# Patient Record
Sex: FEMALE | Race: WHITE | NOT HISPANIC OR LATINO | URBAN - METROPOLITAN AREA
[De-identification: names, ages, dates, MRNs, and addresses within clinical notes are randomized per-mention and may not be internally consistent; named-entity substitution may affect disease eponyms.]

---

## 2023-07-31 RX ORDER — MONTELUKAST SODIUM 10 MG/1
TABLET ORAL
COMMUNITY
Start: 2023-04-28

## 2023-08-01 NOTE — PROGRESS NOTES
08/14/23 0001   Pre-Procedure Phone Call   Procedure Time Verified Yes   Arrival Time 1200   Procedure Location Verified Yes   Medical History Reviewed No   NPO Status Reinforced Yes   Ride and Caregiver Arranged Yes   Patient Knows to Bring Current Medications   (No changes in medications since last reviewed.)

## 2023-08-14 ENCOUNTER — HOSPITAL ENCOUNTER (OUTPATIENT)
Dept: GENERAL RADIOLOGY | Facility: HOSPITAL | Age: 28
Discharge: HOME | End: 2023-08-14
Admitting: OPTOMETRIST
Payer: COMMERCIAL

## 2023-08-14 VITALS
WEIGHT: 152 LBS | BODY MASS INDEX: 25.33 KG/M2 | SYSTOLIC BLOOD PRESSURE: 102 MMHG | HEART RATE: 61 BPM | RESPIRATION RATE: 16 BRPM | TEMPERATURE: 99.8 F | DIASTOLIC BLOOD PRESSURE: 65 MMHG | HEIGHT: 65 IN | OXYGEN SATURATION: 98 %

## 2023-08-14 DIAGNOSIS — H47.10 UNSPECIFIED PAPILLEDEMA: ICD-10-CM

## 2023-08-14 LAB
APPEARANCE CSF: CLEAR
COLOR CSF: COLORLESS
GLUCOSE CSF-MCNC: 61 MG/DL (ref 40–70)
HCG SERPL QL: NEGATIVE
METHOD: ABNORMAL
NUC CELL # CSF MANUAL: 1 /MM3 (ref 0–5)
PROT CSF-MCNC: 16.7 MG/DL (ref 15–45)
RBC # CSF MANUAL: 1 /MM3 (ref 0–0)
TUBE # CSF: 4

## 2023-08-14 PROCEDURE — 89050 BODY FLUID CELL COUNT: CPT | Performed by: OPTOMETRIST

## 2023-08-14 PROCEDURE — 84157 ASSAY OF PROTEIN OTHER: CPT | Performed by: OPTOMETRIST

## 2023-08-14 PROCEDURE — 0 LIDOCAINE 1 % SOLUTION: Performed by: OPTOMETRIST

## 2023-08-14 PROCEDURE — 84703 CHORIONIC GONADOTROPIN ASSAY: CPT | Performed by: RADIOLOGY

## 2023-08-14 PROCEDURE — 82945 GLUCOSE OTHER FLUID: CPT | Performed by: OPTOMETRIST

## 2023-08-14 RX ORDER — LIDOCAINE HYDROCHLORIDE 10 MG/ML
10 INJECTION, SOLUTION INFILTRATION; PERINEURAL ONCE
Status: COMPLETED | OUTPATIENT
Start: 2023-08-14 | End: 2023-08-14

## 2023-08-14 RX ADMIN — LIDOCAINE HYDROCHLORIDE 10 ML: 10 INJECTION, SOLUTION INFILTRATION; PERINEURAL at 14:19

## 2023-08-14 NOTE — H&P
Name: Neha Singer ADMIT: 2023   : 1995  PCP: Oswald Leal MD    MRN: [id number] LOS: 0 days   AGE/SEX: 28 y.o. female  ROOM: Room/bed info not found     Chief complaint   Patient is a 28 y.o. female presents with papilledema.     History of Present Illness: papilledema    Past Surgical History:  Past Surgical History:   Procedure Laterality Date    NOSE SURGERY      broken       Past Medical History:  History reviewed. No pertinent past medical history.    Home Medications:  (Not in a hospital admission)      Allergies:  Patient has no known allergies.    Family History:  No family history on file.    Social History:        Objective     Physical Exam:   Unremark.for intend.proc.    Vital Signs  Temp:  [99.8 øF (37.7 øC)] 99.8 øF (37.7 øC)  Heart Rate:  [87] 87  Resp:  [18] 18  BP: (111)/(70) 111/70    Anticipated Surgical Procedure:  LP    The risks, benefits and alternatives of this procedure have been discussed with the patient or responsible party: Yes      Remy Mcguire MD  23  13:22 EDT

## 2023-08-14 NOTE — DISCHARGE INSTRUCTIONS
EDUCATION /DISCHARGE INSTRUCTION   A lumbar puncture involves insertion of a sterile needle into the spinal canal by the physician   This procedure is performed for several reasons: to detect increased intracranial pressure, the presence of blood in cerebrospinal fluid (CFS), to obtain CSF specimens for laboratory studies, to administer drugs and to relieve pressure by removing CSF.    You will lie on your stomach with a pillow under your stomach.  This opens the vertebral space to allow access to the spinal needle.  The physician will sterilize the area using antiseptic solution and numb the area where the spinal needle will be placed.  If CSF is being removed for specimen, you may be asked to push or beardown to assist with the flow of the CSF. When the procedure is complete, a band aid will be placed over the injection site and you will be taken to the recovery area.    POTENTIAL RISKS OF A LUMBAR PUNCTURE INCLUDE BUT ARE NOT LIMITED TO:  *  Headache    *  Swelling at puncture site  *  Bleeding into the spinal canal *  Temporary difficulty urinating  *  Leakage of CSF   *  Fever  *  Pain caused by nerve irritation    BENEFIT OF PROCEDURE:  This is the only way to obtain spinal fluid or relieve pressure due to increased CSF.  There is no alternative.  THIS EDUCATION INFORMATION WAS REVIEWED PRIOR TO PROCEDURE AND CONSENT.   FOLLOWING A LUMBAR PUNCTURE:  *  Drink plenty of liquids -  Caffeine is recommended   *  Lie down flat for the next 8 hours.  If you get a headache, lie down flat for 24 hours, continue to force fluids and call your doctor if  the headache persists.  *  Go straight home.  DO NOT DRIVE    CALL YOUR DOCTOR IF YOU HAVE:  *  A severe headache that will not go away  *  Redness, swelling or drainage from the puncture site  *  Neck stiffness, numbness or weakness  *  Any new or severe symptoms    Following the procedure, you should continue to take all of your medications as directed by your primary  physician unless otherwise instructed.  There have been no changes to the medications you provided us (with the following exceptions if applicable):    YOU ARE THE MOST IMPORTANT FACTOR IN YOUR RECOVERY.  IF YOU HAVE QUESTIONS OR CONCERNS PLEASE CALL THE RADIOLOGY NURSES AT [phone]

## 2023-08-15 ENCOUNTER — TELEPHONE (OUTPATIENT)
Dept: INTERVENTIONAL RADIOLOGY/VASCULAR | Facility: HOSPITAL | Age: 28
End: 2023-08-15
Payer: COMMERCIAL

## 2023-08-18 ENCOUNTER — ANESTHESIA EVENT (OUTPATIENT)
Dept: PAIN MEDICINE | Facility: HOSPITAL | Age: 28
End: 2023-08-18
Payer: COMMERCIAL

## 2023-08-18 ENCOUNTER — HOSPITAL ENCOUNTER (OUTPATIENT)
Dept: GENERAL RADIOLOGY | Facility: HOSPITAL | Age: 28
Discharge: HOME | End: 2023-08-18
Payer: COMMERCIAL

## 2023-08-18 ENCOUNTER — HOSPITAL ENCOUNTER (OUTPATIENT)
Dept: PAIN MEDICINE | Facility: HOSPITAL | Age: 28
Discharge: HOME | End: 2023-08-18
Payer: COMMERCIAL

## 2023-08-18 ENCOUNTER — ANESTHESIA (OUTPATIENT)
Dept: PAIN MEDICINE | Facility: HOSPITAL | Age: 28
End: 2023-08-18
Payer: COMMERCIAL

## 2023-08-18 VITALS
BODY MASS INDEX: 24.99 KG/M2 | SYSTOLIC BLOOD PRESSURE: 115 MMHG | HEIGHT: 65 IN | DIASTOLIC BLOOD PRESSURE: 47 MMHG | OXYGEN SATURATION: 98 % | RESPIRATION RATE: 16 BRPM | WEIGHT: 150 LBS | TEMPERATURE: 98.2 F | HEART RATE: 68 BPM

## 2023-08-18 DIAGNOSIS — G97.1 POST-DURAL PUNCTURE HEADACHE: Primary | ICD-10-CM

## 2023-08-18 DIAGNOSIS — R52 PAIN: ICD-10-CM

## 2023-08-18 PROCEDURE — 77003 FLUOROGUIDE FOR SPINE INJECT: CPT

## 2023-08-18 RX ORDER — FENTANYL CITRATE 50 UG/ML
50 INJECTION, SOLUTION INTRAMUSCULAR; INTRAVENOUS ONCE
Status: DISCONTINUED | OUTPATIENT
Start: 2023-08-18 | End: 2023-08-19 | Stop reason: HOSPADM

## 2023-08-18 RX ORDER — ACETAZOLAMIDE 500 MG/1
500 CAPSULE, EXTENDED RELEASE ORAL 2 TIMES DAILY
COMMUNITY

## 2023-08-18 RX ORDER — SODIUM CHLORIDE 9 MG/ML
40 INJECTION, SOLUTION INTRAVENOUS AS NEEDED
Status: DISCONTINUED | OUTPATIENT
Start: 2023-08-18 | End: 2023-08-19 | Stop reason: HOSPADM

## 2023-08-18 RX ORDER — SODIUM CHLORIDE 0.9 % (FLUSH) 0.9 %
10 SYRINGE (ML) INJECTION AS NEEDED
Status: DISCONTINUED | OUTPATIENT
Start: 2023-08-18 | End: 2023-08-19 | Stop reason: HOSPADM

## 2023-08-18 RX ORDER — LIDOCAINE HYDROCHLORIDE 10 MG/ML
0.5 INJECTION, SOLUTION INFILTRATION; PERINEURAL ONCE AS NEEDED
Status: DISCONTINUED | OUTPATIENT
Start: 2023-08-18 | End: 2023-08-19 | Stop reason: HOSPADM

## 2023-08-18 RX ORDER — SODIUM CHLORIDE 0.9 % (FLUSH) 0.9 %
10 SYRINGE (ML) INJECTION EVERY 12 HOURS SCHEDULED
Status: DISCONTINUED | OUTPATIENT
Start: 2023-08-18 | End: 2023-08-19 | Stop reason: HOSPADM

## 2023-08-18 RX ORDER — LIDOCAINE HYDROCHLORIDE 10 MG/ML
1 INJECTION, SOLUTION INFILTRATION; PERINEURAL ONCE
Status: DISCONTINUED | OUTPATIENT
Start: 2023-08-18 | End: 2023-08-19 | Stop reason: HOSPADM

## 2023-08-18 RX ORDER — MIDAZOLAM HYDROCHLORIDE 1 MG/ML
1 INJECTION INTRAMUSCULAR; INTRAVENOUS ONCE AS NEEDED
Status: DISCONTINUED | OUTPATIENT
Start: 2023-08-18 | End: 2023-08-19 | Stop reason: HOSPADM

## 2023-08-18 RX ADMIN — SODIUM CHLORIDE, POTASSIUM CHLORIDE, SODIUM LACTATE AND CALCIUM CHLORIDE 500 ML: 600; 310; 30; 20 INJECTION, SOLUTION INTRAVENOUS at 10:50

## 2023-08-18 NOTE — ANESTHESIA PROCEDURE NOTES
Blood Patch      Patient reassessed immediately prior to procedure    Patient location during procedure: pain clinic  Blood patch placed: 8/18/2023 10:30 AM  Stop Time:8/18/2023 10:47 AM    Indications for Blood Patch: headache and dural puncture  Staff  Anesthesiologist: Franklin Calabrese MD  Preanesthetic Checklist  Completed: patient identified, IV checked, site marked, risks and benefits discussed, surgical consent, monitors and equipment checked, pre-op evaluation and timeout performed  Blood Patch Prep  Patient position: prone  Sterile Tech: gloves, mask, sterile barrier and cap.  Prep: ChloraPrep  Patient monitoring: blood pressure monitoring, continuous pulse ox and EKG  Location: L2-L3 (Interlaminar)  Blood Patch Procedure  Sedation:no    Approach: left paramedian   Guidance: fluoroscopy  Needle Gauge 20 G  Needle Type: Tuohy  Solution used: autologous blood  Loss of Resistance Medium: saline  Blood Patch Source:venous    Amount injected: 20 mL  Assessment  Patient tolerance:patient tolerated the procedure well with no apparent complications  Complications:no  Additional Notes  Post-Op Diagnosis Codes:     * Post-dural puncture headache [G97.1]     * Papilledema [377.0]

## 2023-08-18 NOTE — H&P
"[facility]    History and Physical    Patient Name: Neha Singer  :  1995  MRN:  [id number]  Date of Admission: 2023    Subjective     Patient is a 28 y.o. female presents with chief complaint of acute, intermittent, severe headache pain.  Onset of symptoms was gradual starting 2 days ago.  Symptoms are associated/aggravated by activity, sitting, standing, or walking for more than a few minutes. Symptoms improve with pain medication, lying down, and rest.  She has developed a positional headache after a diagnostic dural puncture for papilledema.  On a pain scale from 0-10, she rates her pain as a 3 while supine and a 9 while sitting or standing.  The pain is dull and frontal in nature and associated with some nausea but no vomiting.  She denies any visual disturbances.  She was referred to the pain clinic for lumbar epidural blood patch.    The following portions of the patients history were reviewed and updated as appropriate: current medications, allergies, past medical history, past surgical history, past family history, past social history, and problem list                Objective     Past Medical History: History reviewed. No pertinent past medical history.  Past Surgical History:   Past Surgical History:   Procedure Laterality Date    NOSE SURGERY      broken     Family History: History reviewed. No pertinent family history.  Social History:   Social History     Socioeconomic History    Marital status: Single       Vital Signs Range for the last 24 hours  Temperature: Temp:  [36.8 øC (98.2 øF)] 36.8 øC (98.2 øF)   Temp Source: Temp src: Infrared   BP: BP: (106)/(66) 106/66   Pulse: Heart Rate:  [57] 57   Respirations: Resp:  [16] 16   SPO2: SpO2:  [99 %] 99 %   O2 Amount (l/min):     O2 Devices Device (Oxygen Therapy): room air   Weight: Weight:  [68 kg (150 lb)] 68 kg (150 lb)     Flowsheet Rows      Flowsheet Row First Filed Value   Admission Height 165.1 cm (65\") Documented at " 08/18/2023 0956   Admission Weight 68 kg (150 lb) Documented at 08/18/2023 0956            --------------------------------------------------------------------------------    Current Outpatient Medications   Medication Sig Dispense Refill    acetaZOLAMIDE (DIAMOX) 500 MG capsule Take 1 capsule by mouth 2 (Two) Times a Day.      montelukast (SINGULAIR) 10 MG tablet TAKE 1 TABLET(10 MG) BY MOUTH 1 TIME EACH DAY       Current Facility-Administered Medications   Medication Dose Route Frequency Provider Last Rate Last Admin    lidocaine (XYLOCAINE) 1 % injection 0.5 mL  0.5 mL Intradermal Once PRN Franklin Calabrese MD        sodium chloride 0.9 % flush 10 mL  10 mL Intravenous Q12H Franklin Calabrese MD        sodium chloride 0.9 % flush 10 mL  10 mL Intravenous PRN Franklin Calabrese MD        sodium chloride 0.9 % infusion 40 mL  40 mL Intravenous Franklin Botello MD           --------------------------------------------------------------------------------  Assessment & Plan      Anesthesia Evaluation     Patient summary reviewed and Nursing notes reviewed   NPO Solid Status: > 8 hours  NPO Liquid Status: > 2 hours    Pain impairs ability to perform ADLs: Dressing, Toileting, Meal prep/Eating, Driving, Working and Exercise/Activity  Modalities previously tried to control pain with limited effectiveness within the last 4-6 weeks: Rest and OTC medications     Airway   Mallampati: II  TM distance: >3 FB  Neck ROM: full  Dental - normal exam     Pulmonary - negative pulmonary ROS and normal exam    breath sounds clear to auscultation  Cardiovascular - negative cardio ROS and normal exam    Rhythm: regular  Rate: normal    (-) angina, orthopnea, PND, JOLLY      Neuro/Psych- negative ROS  GI/Hepatic/Renal/Endo - negative ROS     Musculoskeletal (-) negative ROS    Abdominal    Substance History - negative use     OB/GYN negative ob/gyn ROS         Other - negative ROS                  Diagnosis and Plan    Treatment  Plan  ASA 3      Procedures: Epidural blood patch, With fluoroscopy,      Anesthetic plan and risks discussed with patient.      Alternative treatment consists of conservative management with hydration, bedrest and oral pain medication.      Diagnosis     * Post-dural puncture headache [G97.1]

## 2023-09-08 NOTE — PROGRESS NOTES
Patient ID: Neha Singer is a 28 y.o. female is being seen for consultation today at the request of Harmeet German OD for arachnoid cyst. Patient had MRI brain and MRI venogram done at [facility] on 07/03/2023.    Subjective     The patient is here in regards to   Chief Complaint   Patient presents with    Arachnoid cyst        History of Present Illness  Neha noticed a right-sided headache in association with blurry vision while reading and was worked up by her ophthalmologist for papilledema.  She was found to have papilledema and subsequent MRI was performed and she was found to have a left anterior temporal arachnoid cyst.  She had a lumbar puncture with an opening pressure around 34 which was then subsequently decreased to 22 and she found immediate relief in her headache and improvement in her vision.  She has been started on Diamox and her vision has remained improved since then    While in the room and during my examination of the patient I wore a mask and eye protection.  I washed my hands before and after this patient encounter.  The patient was also wearing a mask.    The following portions of the patient's history were reviewed and updated as appropriate: allergies, current medications, past family history, past medical history, past social history, past surgical history and problem list.    Review of Systems   Constitutional:  Negative for activity change, chills, fatigue and fever.   Respiratory:  Negative for cough and shortness of breath.    Cardiovascular:  Negative for chest pain.   Gastrointestinal:  Negative for nausea.   Musculoskeletal:  Negative for gait problem.   Neurological:  Positive for numbness (numbness in fingertips and toes). Negative for dizziness, weakness, light-headedness and headaches.      Past Medical History:   Diagnosis Date    Post-dural puncture headache 08/18/2023       No Known Allergies    Family History   Problem Relation Age of Onset    Diabetes Paternal  Grandfather     Cancer Paternal Grandmother         Breast    Hearing loss Paternal Grandmother        Social History     Socioeconomic History    Marital status: Single   Tobacco Use    Smoking status: Never   Vaping Use    Vaping Use: Never used   Substance and Sexual Activity    Alcohol use: Yes     Alcohol/week: 2.0 standard drinks     Types: 2 Drinks containing 0.5 oz of alcohol per week     Comment: Occasional    Drug use: Never    Sexual activity: Yes     Partners: Male     Birth control/protection: None       Past Surgical History:   Procedure Laterality Date    NOSE SURGERY  2007    broken         Objective     Vitals:    09/13/23 1431   BP: 104/66   Pulse: 84   Resp: 16   Temp: 98 °F (36.7 °C)   SpO2: 98%     Body mass index is 24.63 kg/m².    Physical Exam  Constitutional:       Appearance: Normal appearance.   HENT:      Head: Normocephalic and atraumatic.   Eyes:      Extraocular Movements: Extraocular movements intact.      Conjunctiva/sclera: Conjunctivae normal.      Pupils: Pupils are equal, round, and reactive to light.   Cardiovascular:      Rate and Rhythm: Normal rate and regular rhythm.      Pulses: Normal pulses.   Pulmonary:      Breath sounds: Normal breath sounds.   Abdominal:      Palpations: Abdomen is soft.   Musculoskeletal:         General: Normal range of motion.      Cervical back: Normal range of motion and neck supple.   Skin:     General: Skin is warm and dry.   Neurological:      Mental Status: She is alert and oriented to person, place, and time.      Cranial Nerves: Cranial nerves 2-12 are intact.      Motor: Motor function is intact. No weakness or atrophy.      Coordination: Coordination is intact. Romberg sign negative. Romberg Test normal.      Gait: Gait is intact. Gait normal.      Deep Tendon Reflexes: Reflexes are normal and symmetric.      Reflex Scores:       Tricep reflexes are 2+ on the right side and 2+ on the left side.       Bicep reflexes are 2+ on the right  side and 2+ on the left side.       Brachioradialis reflexes are 2+ on the right side and 2+ on the left side.       Patellar reflexes are 2+ on the right side and 2+ on the left side.       Achilles reflexes are 2+ on the right side and 2+ on the left side.  Psychiatric:         Speech: Speech normal.       Neurologic Exam     Mental Status   Oriented to person, place, and time.   Attention: normal. Concentration: normal.   Speech: speech is normal   Level of consciousness: alert    Cranial Nerves   Cranial nerves II through XII intact.     CN III, IV, VI   Pupils are equal, round, and reactive to light.    Motor Exam   Muscle bulk: normal  Overall muscle tone: normal    Strength   Strength 5/5 except as noted.     Sensory Exam   Light touch normal.     Gait, Coordination, and Reflexes     Gait  Gait: normal    Coordination   Romberg: negative    Reflexes   Reflexes 2+ except as noted.   Right brachioradialis: 2+  Left brachioradialis: 2+  Right biceps: 2+  Left biceps: 2+  Right triceps: 2+  Left triceps: 2+  Right patellar: 2+  Left patellar: 2+  Right achilles: 2+  Left achilles: 2+    Assessment & Plan   Independent Review of Radiographic Studies:      I personally reviewed the images from the following studies.    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  If she continues to have progression of visual symptoms then my recommendation would be to pursue an optic nerve sheath fenestration and if that does not work we could try venous stenting with my partner Dr. Betlre.  She is looking to get pregnant and I do not think it is a good idea to introduce a lumboperitoneal catheter or a ventriculoperitoneal catheter  under those circumstances.  She could continue to do well with Diamox alone.    I also counseled her on weight loss and dietary changes in regards to correlation with IIH and potential improvement in symptoms.    Medical Decision Making:      Follow-up in 1 year         Diagnoses and all orders for this visit:    1. IIH (idiopathic intracranial hypertension) (Primary)             Patient Instructions/Recommendations:    Follow-up in 1 year      Sonido Griffith MD  09/13/23  15:01 EDT

## 2023-09-13 ENCOUNTER — OFFICE VISIT (OUTPATIENT)
Dept: NEUROSURGERY | Facility: CLINIC | Age: 28
End: 2023-09-13
Payer: COMMERCIAL

## 2023-09-13 VITALS
DIASTOLIC BLOOD PRESSURE: 66 MMHG | BODY MASS INDEX: 24.66 KG/M2 | OXYGEN SATURATION: 98 % | HEIGHT: 65 IN | WEIGHT: 148 LBS | HEART RATE: 84 BPM | TEMPERATURE: 98 F | RESPIRATION RATE: 16 BRPM | SYSTOLIC BLOOD PRESSURE: 104 MMHG

## 2023-09-13 DIAGNOSIS — G93.2 IIH (IDIOPATHIC INTRACRANIAL HYPERTENSION): Primary | ICD-10-CM

## 2023-10-06 ENCOUNTER — TELEPHONE (OUTPATIENT)
Dept: NEUROLOGY | Facility: CLINIC | Age: 28
End: 2023-10-06
Payer: COMMERCIAL

## 2023-10-24 ENCOUNTER — TELEPHONE (OUTPATIENT)
Dept: NEUROLOGY | Facility: CLINIC | Age: 28
End: 2023-10-24
Payer: COMMERCIAL

## 2024-03-25 ENCOUNTER — OFFICE VISIT (OUTPATIENT)
Dept: NEUROLOGY | Facility: CLINIC | Age: 29
End: 2024-03-25
Payer: COMMERCIAL

## 2024-03-25 VITALS
BODY MASS INDEX: 23.32 KG/M2 | HEIGHT: 65 IN | OXYGEN SATURATION: 99 % | SYSTOLIC BLOOD PRESSURE: 124 MMHG | WEIGHT: 140 LBS | DIASTOLIC BLOOD PRESSURE: 70 MMHG | HEART RATE: 70 BPM

## 2024-03-25 DIAGNOSIS — G93.2 BENIGN INTRACRANIAL HYPERTENSION: Primary | ICD-10-CM

## 2024-03-25 PROCEDURE — 99204 OFFICE O/P NEW MOD 45 MIN: CPT | Performed by: PSYCHIATRY & NEUROLOGY

## 2024-03-25 NOTE — PROGRESS NOTES
"Chief Complaint   Patient presents with    Cerebral Cyst     Unspecified Papilledema       Patient ID: Neha Singer is a 28 y.o. female.    HPI: I had the pleasure of seeing your patient today.  As you may know she is a 28-year-old female being seen at the request of and referred to us by Dr. Leal for history of benign intracranial hypertension.  She apparently began having issues with headaches sometime in 2022.  She also noted some blurriness of the vision.  The headaches were a holocephalic \"pressure\" headache that seem to worsen with change of position.  She had no specific sensitivity to light or sound with her headache.  No nausea or vomiting.  She did have the blurry vision as I mentioned.  This went on and she eventually had an evaluation with her optometrist who noted optic nerve swelling.  She was then referred to an ophthalmologist who did confirm that.  She also had visual field testing.  She was referred for MRI imaging as well as MRV of the head.  The MRI did show an arachnoid cyst located at the anterior aspect of the left middle cranial fossa.  MRV did not show any specific venous sinus stenoses.  She was referred for lumbar puncture where opening pressure was noted to be 32.5.  Closing pressure was 22.  She seems to be doing much better since the lumbar puncture.  She did require a blood patch due to headache.  She was started on Diamox 500 mg twice daily.  She states that follow-up ophthalmologic evaluation revealed significant improvement.  She has been weaned down to 250 mg twice daily and continues at that dose.  She does have scheduled follow-up with ophthalmology soon.  She did also see a neurosurgeon.  No neurosurgical intervention was deemed necessary.  She did lose some weight.  She says that she has lost about 10 pounds.  She did have an IUD which has been removed.  She has not been taking any vitamin A derived medications.  No chronic steroids.  No severe traumatic head " injury.    The following portions of the patient's history were reviewed and updated as appropriate: allergies, current medications, past family history, past medical history, past social history, past surgical history and problem list.    Review of Systems   Neurological:  Positive for light-headedness and numbness (toes). Negative for dizziness, tremors, seizures, syncope, facial asymmetry, speech difficulty, weakness and headaches.   Psychiatric/Behavioral:  Negative for agitation, behavioral problems, confusion, decreased concentration, dysphoric mood, hallucinations, self-injury, sleep disturbance and suicidal ideas. The patient is not nervous/anxious and is not hyperactive.       I have reviewed the review of systems above performed by my medical assistant.      Vitals:    03/25/24 1144   BP: 124/70   Pulse: 70   SpO2: 99%       Neurologic Exam     Mental Status   Oriented to person, place, and time.   Registration: recalls 3 of 3 objects. Follows 3 step commands.   Attention: normal. Concentration: normal.   Speech: speech is normal   Level of consciousness: alert  Knowledge: consistent with education (No deficits found.).   Normal comprehension.     Cranial Nerves     CN II   Visual fields full to confrontation.     CN III, IV, VI   Pupils are equal, round, and reactive to light.  Extraocular motions are normal.   CN III: no CN III palsy  CN VI: no CN VI palsy  Nystagmus: none   Diplopia: none    CN V   Facial sensation intact.     CN VII   Facial expression full, symmetric.     CN VIII   CN VIII normal.     CN IX, X   CN IX normal.   CN X normal.     CN XI   CN XI normal.     CN XII   CN XII normal.     Motor Exam   Muscle bulk: normal  Right arm tone: normal  Left arm tone: normal  Right leg tone: normal  Left leg tone: normal    Strength   Right neck flexion: 5/5  Left neck flexion: 5/5  Right neck extension: 5/5  Left neck extension: 5/5  Right deltoid: 5/5  Left deltoid: 5/5  Right biceps: 5/5  Left  biceps: 5/5  Right triceps: 5/5  Left triceps: 5/5  Right wrist flexion: 5/5  Left wrist flexion: 5/5  Right wrist extension: 5/5  Left wrist extension: 5/5  Right interossei: 5/5  Left interossei: 5/5  Right abdominals: 5/5  Left abdominals: 5/5  Right iliopsoas: 5/5  Left iliopsoas: 5/5  Right quadriceps: 5/5  Left quadriceps: 5/5  Right hamstrin/5  Left hamstrin/5  Right glutei: 5/5  Left glutei: 5/5  Right anterior tibial: 5/5  Left anterior tibial: 5/5  Right posterior tibial: 5/5  Left posterior tibial: 5/5  Right peroneal: 5/5  Left peroneal: 5/5  Right gastroc: 5/5  Left gastroc: 5/5    Sensory Exam   Light touch normal.   Vibration normal.   Proprioception normal.   Pinprick normal.     Gait, Coordination, and Reflexes     Gait  Gait: normal    Coordination   Romberg: negative    Tremor   Resting tremor: absent  Intention tremor: absent    Reflexes   Right brachioradialis: 2+  Left brachioradialis: 2+  Right biceps: 2+  Left biceps: 2+  Right triceps: 2+  Left triceps: 2+  Right patellar: 2+  Left patellar: 2+  Right achilles: 2+  Left achilles: 2+  Right : 2+  Left : 2+Station is normal.       Physical Exam  Vitals reviewed.   Constitutional:       General: She is not in acute distress.     Appearance: She is well-developed.   HENT:      Head: Normocephalic and atraumatic.   Eyes:      Extraocular Movements: EOM normal.      Pupils: Pupils are equal, round, and reactive to light.   Cardiovascular:      Rate and Rhythm: Normal rate and regular rhythm.      Heart sounds: Normal heart sounds.   Pulmonary:      Effort: Pulmonary effort is normal. No respiratory distress.      Breath sounds: Normal breath sounds.   Abdominal:      General: Bowel sounds are normal. There is no distension.      Palpations: Abdomen is soft.      Tenderness: There is no abdominal tenderness.   Musculoskeletal:         General: No deformity.      Cervical back: Normal range of motion.   Skin:     General: Skin is  warm.      Findings: No rash.   Neurological:      Mental Status: She is oriented to person, place, and time.      Coordination: Romberg Test normal.      Gait: Gait is intact.      Deep Tendon Reflexes:      Reflex Scores:       Tricep reflexes are 2+ on the right side and 2+ on the left side.       Bicep reflexes are 2+ on the right side and 2+ on the left side.       Brachioradialis reflexes are 2+ on the right side and 2+ on the left side.       Patellar reflexes are 2+ on the right side and 2+ on the left side.       Achilles reflexes are 2+ on the right side and 2+ on the left side.  Psychiatric:         Speech: Speech normal.         Judgment: Judgment normal.         Procedures    Assessment/Plan: The plan will be to continue the Diamox 250 mg daily.  I did mention that she can see her ophthalmologist every 3 months.  After so many follow-up visits we would likely consider weaning her off of the Diamox.  She can use as needed naproxen for as needed headache onset otherwise.  We will see her back in 4 months or sooner if needed. A total of 45 minutes was spent face-to-face with the patient today.  Of that greater than 50% of this time was spent discussing signs and symptoms of idiopathic intracranial hypertension, patient education, plan of care and prognosis.           Diagnoses and all orders for this visit:    1. Benign intracranial hypertension (Primary)           Franklin Alvares II, MD

## 2024-06-07 DIAGNOSIS — G93.2 BENIGN INTRACRANIAL HYPERTENSION: Primary | ICD-10-CM

## 2024-06-07 RX ORDER — ACETAZOLAMIDE 500 MG/1
500 CAPSULE, EXTENDED RELEASE ORAL 2 TIMES DAILY
Qty: 60 CAPSULE | Refills: 1 | Status: SHIPPED | OUTPATIENT
Start: 2024-06-07

## 2024-08-06 ENCOUNTER — OFFICE VISIT (OUTPATIENT)
Dept: NEUROLOGY | Facility: CLINIC | Age: 29
End: 2024-08-06
Payer: COMMERCIAL

## 2024-08-06 VITALS
SYSTOLIC BLOOD PRESSURE: 110 MMHG | DIASTOLIC BLOOD PRESSURE: 70 MMHG | HEIGHT: 65 IN | HEART RATE: 67 BPM | BODY MASS INDEX: 22.99 KG/M2 | OXYGEN SATURATION: 99 % | WEIGHT: 138 LBS

## 2024-08-06 DIAGNOSIS — G93.2 BENIGN INTRACRANIAL HYPERTENSION: Primary | ICD-10-CM

## 2024-08-06 PROCEDURE — 99214 OFFICE O/P EST MOD 30 MIN: CPT | Performed by: PSYCHIATRY & NEUROLOGY

## 2024-08-06 RX ORDER — MELOXICAM 15 MG/1
TABLET ORAL
COMMUNITY
Start: 2024-08-01

## 2024-08-06 NOTE — PROGRESS NOTES
Chief Complaint   Patient presents with    Benign intracranial hypertension       Patient ID: Neha Singer is a 29 y.o. female.    HPI: I have had the pleasure of seeing your patient again today.  As you may know she is a 29-year-old female here for the management of benign intracranial hypertension.  She is still currently taking Diamox.  She is at a dose of 250 mg twice a day.  She states that she did have follow-up with her ophthalmologist not too long ago.  We do not yet have the follow-up report to review however she mentions that the evaluation went favorably and that there may not be much swelling any longer.  She did mention experiencing a lapse in her prescription renewal of the Diamox.  [place]  She notes that during that time she did experience a recurrence of headache.  She does mention it was quite severe.  Once back on the medicine her headaches resolved.  She  is not currently experiencing any significant headaches in fact she says that she is doing well today.  No new symptoms neurologically.    The following portions of the patient's history were reviewed and updated as appropriate: allergies, current medications, past family history, past medical history, past social history, past surgical history and problem list.    Review of Systems   Neurological:  Negative for dizziness, tremors, seizures, syncope, facial asymmetry, speech difficulty, weakness, light-headedness, numbness and headaches.   Psychiatric/Behavioral:  Negative for agitation, behavioral problems, confusion, decreased concentration, dysphoric mood, hallucinations, self-injury, sleep disturbance and suicidal ideas. The patient is not nervous/anxious and is not hyperactive.       I have reviewed the review of systems above performed by my medical assistant.      Vitals:    08/06/24 0834   BP: 110/70   Pulse: 67   SpO2: 99%       Neurologic Exam     Mental Status   Oriented to person, place, and time.    Concentration: normal.   Level of consciousness: alert  Knowledge: consistent with education (No deficits found.).     Cranial Nerves     CN II   Visual fields full to confrontation.     CN III, IV, VI   Pupils are equal, round, and reactive to light.  Extraocular motions are normal.   CN III: no CN III palsy  CN VI: no CN VI palsy    CN V   Facial sensation intact.     CN VII   Facial expression full, symmetric.     CN VIII   CN VIII normal.     CN IX, X   CN IX normal.   CN X normal.     CN XI   CN XI normal.     CN XII   CN XII normal.     Motor Exam     Strength   Right neck flexion: 5/5  Left neck flexion: 5/5  Right neck extension: 5/5  Left neck extension: 5/5  Right deltoid: 5/5  Left deltoid: 5/5  Right biceps: 5/5  Left biceps: 5/5  Right triceps: 5/5  Left triceps: 5/5  Right wrist flexion: 5/5  Left wrist flexion: 5/5  Right wrist extension: 5/5  Left wrist extension: 5/5  Right interossei: 5/5  Left interossei: 5/5  Right abdominals: 5/5  Left abdominals: 5/5  Right iliopsoas: 5/5  Left iliopsoas: 5/5  Right quadriceps: 5/5  Left quadriceps: 5/5  Right hamstrin/5  Left hamstrin/5  Right glutei: 5/5  Left glutei: 5/5  Right anterior tibial: 5/5  Left anterior tibial: 5/5  Right posterior tibial: 5/5  Left posterior tibial: 5/5  Right peroneal: 5/5  Left peroneal: 5/5  Right gastroc: 5/5  Left gastroc: 5/5    Sensory Exam   Light touch normal.   Vibration normal.     Gait, Coordination, and Reflexes     Gait  Gait: normal    Reflexes   Right brachioradialis: 2+  Left brachioradialis: 2+  Right biceps: 2+  Left biceps: 2+  Right triceps: 2+  Left triceps: 2+  Right patellar: 2+  Left patellar: 2+  Right achilles: 2+  Left achilles: 2+  Right : 2+  Left : 2+Station is normal.       Physical Exam  Vitals reviewed.   Constitutional:       Appearance: She is well-developed.   HENT:      Head: Normocephalic and atraumatic.   Eyes:      Extraocular Movements: EOM normal.      Pupils: Pupils are  equal, round, and reactive to light.   Cardiovascular:      Rate and Rhythm: Normal rate and regular rhythm.   Pulmonary:      Breath sounds: Normal breath sounds.   Musculoskeletal:         General: Normal range of motion.   Skin:     General: Skin is warm.   Neurological:      Mental Status: She is oriented to person, place, and time.      Gait: Gait is intact.      Deep Tendon Reflexes:      Reflex Scores:       Tricep reflexes are 2+ on the right side and 2+ on the left side.       Bicep reflexes are 2+ on the right side and 2+ on the left side.       Brachioradialis reflexes are 2+ on the right side and 2+ on the left side.       Patellar reflexes are 2+ on the right side and 2+ on the left side.       Achilles reflexes are 2+ on the right side and 2+ on the left side.        Procedures    Assessment/Plan: I would like to see the most recent ophthalmologic cool evaluation to review the funduscopic exam.  Our plan will be to continue with the Diamox for now.  No need for further neurodiagnostic testing.  We will see her back again in December however I advised her to continue follow-up with ophthalmology in October if possible.  We will see her back soon however she is to call after her ophthalmology appointment in October.  A total of 30 minutes was spent face-to-face with the patient today.  Of that greater than 50% of this time was spent discussing signs and symptoms of pseudotumor cerebri, patient education, plan of care and prognosis.         Diagnoses and all orders for this visit:    1. Benign intracranial hypertension (Primary)           Franklin Alvares II, MD

## 2024-12-11 ENCOUNTER — OFFICE VISIT (OUTPATIENT)
Dept: NEUROLOGY | Facility: CLINIC | Age: 29
End: 2024-12-11
Payer: COMMERCIAL

## 2024-12-11 VITALS
HEART RATE: 65 BPM | WEIGHT: 152 LBS | BODY MASS INDEX: 25.33 KG/M2 | SYSTOLIC BLOOD PRESSURE: 124 MMHG | OXYGEN SATURATION: 98 % | DIASTOLIC BLOOD PRESSURE: 88 MMHG | HEIGHT: 65 IN

## 2024-12-11 DIAGNOSIS — G93.2 BENIGN INTRACRANIAL HYPERTENSION: Primary | ICD-10-CM

## 2024-12-11 PROCEDURE — 99214 OFFICE O/P EST MOD 30 MIN: CPT | Performed by: PSYCHIATRY & NEUROLOGY

## 2024-12-11 RX ORDER — POTASSIUM CHLORIDE 1500 MG/1
20 TABLET, EXTENDED RELEASE ORAL DAILY
Qty: 30 TABLET | Refills: 11 | Status: SHIPPED | OUTPATIENT
Start: 2024-12-11 | End: 2025-12-11

## 2024-12-11 RX ORDER — ACETAZOLAMIDE 250 MG/1
250 TABLET ORAL 3 TIMES DAILY
Qty: 90 TABLET | Refills: 4 | Status: SHIPPED | OUTPATIENT
Start: 2024-12-11 | End: 2025-01-10

## 2024-12-11 NOTE — PROGRESS NOTES
"Chief Complaint   Patient presents with    Benign intracranial hypertension       Patient ID: Neha Singer is a 29 y.o. female.    HPI: I have had the pleasure of seeing your patient today.  As you may know she is a 29-year-old female here for the management of benign intracranial hypertension.  She did recently see her ophthalmologist who mentioned that the swelling in the right eye is slightly increased.  The left eye seemed stable.  She does have headaches 2 days/week.  They are not severe.  No significant sensitivity to light or sound.  No nausea or vomiting.  She is on Diamox 250 mg twice daily.  She is not experiencing any significant side effects to the Diamox based on questioning.  She may experience some \"floaters\" however no blackouts of vision.  No spots in her visual fields.    The following portions of the patient's history were reviewed and updated as appropriate: allergies, current medications, past family history, past medical history, past social history, past surgical history and problem list.    Review of Systems   Eyes:  Positive for photophobia (during HA).   Neurological:  Positive for headaches. Negative for dizziness, tremors, seizures, syncope, facial asymmetry, speech difficulty, weakness, light-headedness and numbness.   Psychiatric/Behavioral:  Negative for agitation, behavioral problems, confusion, decreased concentration, dysphoric mood, hallucinations, self-injury, sleep disturbance and suicidal ideas. The patient is not nervous/anxious and is not hyperactive.       I have reviewed the review of systems above performed by my medical assistant.      Vitals:    12/11/24 1445   BP: 124/88   Pulse: 65   SpO2: 98%       Neurological Exam  Mental Status  Awake, alert and oriented to person, place and time. Recent and remote memory are intact. Speech is normal. Language is fluent with no aphasia. Attention and concentration are normal. Fund of knowledge is appropriate for level of " education.    Cranial Nerves  CN I: Sense of smell is normal.  CN II: Visual acuity is normal.  CN III, IV, VI: Extraocular movements intact bilaterally. Pupils equal round and reactive to light bilaterally.  CN V: Facial sensation is normal.  CN VII: Full and symmetric facial movement.  CN XI: Shoulder shrug strength is normal.  CN XII: Tongue midline without atrophy or fasciculations.    Motor  Normal muscle bulk throughout. No fasciculations present. Normal muscle tone. No abnormal involuntary movements. No pronator drift.                                             Right                     Left  Rhomboids                            5                          5  Infraspinatus                          5                          5  Supraspinatus                       5                          5  Deltoid                                   5                          5   Biceps                                   5                          5  Brachioradialis                      5                          5   Triceps                                  5                          5   Pronator                                5                          5   Supinator                              5                           5   Wrist flexor                            5                          5   Wrist extensor                       5                          5   Finger flexor                          5                          5   Finger extensor                     5                          5   Interossei                              5                          5   Abductor pollicis brevis         5                          5   Flexor pollicis brevis             5                          5   Opponens pollicis                 5                          5  Extensor digitorum               5                          5  Abductor digiti minimi           5                          5   Abdominal                            5                           5  Glutei                                    5                          5  Hip abductor                         5                          5  Hip adductor                         5                          5   Iliopsoas                               5                          5   Quadriceps                           5                          5   Hamstring                             5                          5   Gastrocnemius                     5                           5   Anterior tibialis                      5                          5   Posterior tibialis                    5                          5   Peroneal                               5                          5  Ankle dorsiflexor                   5                          5  Ankle plantar flexor              5                           5  Extensor hallucis longus      5                           5    Sensory  Sensation is intact to light touch, pinprick, vibration and proprioception in all four extremities.    Reflexes  Deep tendon reflexes are 2+ and symmetric in all four extremities.    Right pathological reflexes: Tai's absent.  Left pathological reflexes: Tai's absent.    Coordination    Finger-to-nose, rapid alternating movements and heel-to-shin normal bilaterally without dysmetria.    Gait  Normal casual, toe, heel and tandem gait.       Physical Exam  Vitals reviewed.   Constitutional:       Appearance: She is well-developed.   HENT:      Head: Normocephalic and atraumatic.   Eyes:      Extraocular Movements: Extraocular movements intact.      Pupils: Pupils are equal, round, and reactive to light.   Cardiovascular:      Rate and Rhythm: Normal rate and regular rhythm.   Pulmonary:      Breath sounds: Normal breath sounds.   Musculoskeletal:         General: Normal range of motion.   Skin:     General: Skin is warm.   Neurological:      Coordination: Coordination is intact.      Deep Tendon Reflexes:  Reflexes are normal and symmetric.   Psychiatric:         Speech: Speech normal.         Procedures    Assessment/Plan: Our plan is to continue with the Diamox however I would like for her to take 3 doses.  She will try 1 in the morning and 2 at night.  She will start potassium 20 mEq daily.  She will see her ophthalmologist sometime in March and we will see her after that visit.  A total of 30 minutes was spent face-to-face with the patient today.  Of that greater than 50% of this time was spent discussing signs and symptoms of benign intracranial hypertension, patient education, plan of care and prognosis.         Diagnoses and all orders for this visit:    1. Benign intracranial hypertension (Primary)  -     acetaZOLAMIDE (DIAMOX) 250 MG tablet; Take 1 tablet by mouth 3 (Three) Times a Day for 30 days.  Dispense: 90 tablet; Refill: 4  -     potassium chloride (KLOR-CON M20) 20 MEQ CR tablet; Take 1 tablet by mouth Daily.  Dispense: 30 tablet; Refill: 11           Franklin Alvares II, MD

## 2025-02-09 DIAGNOSIS — G93.2 BENIGN INTRACRANIAL HYPERTENSION: ICD-10-CM

## 2025-02-10 RX ORDER — ACETAZOLAMIDE 250 MG/1
250 TABLET ORAL 3 TIMES DAILY
Qty: 90 TABLET | Refills: 1 | Status: SHIPPED | OUTPATIENT
Start: 2025-02-10

## 2025-02-15 DIAGNOSIS — G93.2 BENIGN INTRACRANIAL HYPERTENSION: ICD-10-CM

## 2025-02-17 RX ORDER — ACETAZOLAMIDE 250 MG/1
250 TABLET ORAL 3 TIMES DAILY
Qty: 90 TABLET | Refills: 1 | OUTPATIENT
Start: 2025-02-17

## 2025-03-21 ENCOUNTER — OFFICE VISIT (OUTPATIENT)
Dept: NEUROLOGY | Facility: CLINIC | Age: 30
End: 2025-03-21
Payer: COMMERCIAL

## 2025-03-21 VITALS
OXYGEN SATURATION: 99 % | WEIGHT: 150 LBS | HEART RATE: 75 BPM | DIASTOLIC BLOOD PRESSURE: 66 MMHG | SYSTOLIC BLOOD PRESSURE: 124 MMHG | BODY MASS INDEX: 24.99 KG/M2 | HEIGHT: 65 IN

## 2025-03-21 DIAGNOSIS — G93.2 BENIGN INTRACRANIAL HYPERTENSION: Primary | ICD-10-CM

## 2025-03-21 PROCEDURE — 99214 OFFICE O/P EST MOD 30 MIN: CPT | Performed by: PSYCHIATRY & NEUROLOGY

## 2025-03-21 RX ORDER — VITAMIN E (DL,TOCOPHERYL ACET) 180 MG
1 CAPSULE ORAL DAILY
Qty: 30 CAPSULE | Refills: 4 | Status: SHIPPED | OUTPATIENT
Start: 2025-03-21 | End: 2025-04-20

## 2025-03-21 NOTE — PROGRESS NOTES
Chief Complaint   Patient presents with    Benign intracranial hypertension       Patient ID: Neha Singer is a 29 y.o. female.    HPI:  I had the pleasure of seeing your patient again today.  As you may know she is a 29-year-old female here for the management of benign intracranial hypertension.  She is currently taking Diamox 250 mg, 1 tablet in the morning and 3 tablets at night.  She states that she is feeling better.  She has had significant improvement in her headache frequency and severity.  Her most recent ophthalmologic will assessment in December was favorable.  No blackouts of vision.  No visual field cut.  No significant spots in her visual fields.    The following portions of the patient's history were reviewed and updated as appropriate: allergies, current medications, past family history, past medical history, past social history, past surgical history and problem list.    Review of Systems   Neurological:  Positive for numbness and headaches. Negative for dizziness, tremors, seizures, syncope, facial asymmetry, speech difficulty, weakness and light-headedness.   Psychiatric/Behavioral:  Negative for agitation, behavioral problems, confusion, decreased concentration, dysphoric mood, hallucinations, self-injury, sleep disturbance and suicidal ideas. The patient is not nervous/anxious and is not hyperactive.       I have reviewed the review of systems above performed by my medical assistant.      Vitals:    03/21/25 1514   BP: 124/66   Pulse: 75   SpO2: 99%       Neurological Exam  Mental Status  Awake, alert and oriented to person, place and time. Recent and remote memory are intact. Speech is normal. Language is fluent with no aphasia. Attention and concentration are normal. Fund of knowledge is appropriate for level of education.    Cranial Nerves  CN I: Sense of smell is normal.  CN II: Visual acuity is normal.  CN III, IV, VI: Extraocular movements intact bilaterally. Pupils equal round and  reactive to light bilaterally.  CN V: Facial sensation is normal.  CN VII: Full and symmetric facial movement.  CN XI: Shoulder shrug strength is normal.  CN XII: Tongue midline without atrophy or fasciculations.    Motor  Normal muscle bulk throughout. No fasciculations present. Normal muscle tone. No abnormal involuntary movements. No pronator drift.                                             Right                     Left  Rhomboids                            5                          5  Infraspinatus                          5                          5  Supraspinatus                       5                          5  Deltoid                                   5                          5   Biceps                                   5                          5  Brachioradialis                      5                          5   Triceps                                  5                          5   Pronator                                5                          5   Supinator                              5                           5   Wrist flexor                            5                          5   Wrist extensor                       5                          5   Finger flexor                          5                          5   Finger extensor                     5                          5   Interossei                              5                          5   Abductor pollicis brevis         5                          5   Flexor pollicis brevis             5                          5   Opponens pollicis                 5                          5  Extensor digitorum               5                          5  Abductor digiti minimi           5                          5   Abdominal                            5                          5  Glutei                                    5                          5  Hip abductor                         5                          5  Hip adductor                          5                          5   Iliopsoas                               5                          5   Quadriceps                           5                          5   Hamstring                             5                          5   Gastrocnemius                     5                           5   Anterior tibialis                      5                          5   Posterior tibialis                    5                          5   Peroneal                               5                          5  Ankle dorsiflexor                   5                          5  Ankle plantar flexor              5                           5  Extensor hallucis longus      5                           5    Sensory  Sensation is intact to light touch, pinprick, vibration and proprioception in all four extremities.    Reflexes  Deep tendon reflexes are 2+ and symmetric in all four extremities.    Right pathological reflexes: Tai's absent.  Left pathological reflexes: Tai's absent.    Coordination    Finger-to-nose, rapid alternating movements and heel-to-shin normal bilaterally without dysmetria.    Gait  Normal casual, toe, heel and tandem gait.       Physical Exam  Vitals reviewed.   Constitutional:       Appearance: She is well-developed.   HENT:      Head: Normocephalic and atraumatic.   Eyes:      Extraocular Movements: Extraocular movements intact.      Pupils: Pupils are equal, round, and reactive to light.   Cardiovascular:      Rate and Rhythm: Normal rate and regular rhythm.   Pulmonary:      Breath sounds: Normal breath sounds.   Musculoskeletal:         General: Normal range of motion.   Skin:     General: Skin is warm.   Neurological:      Coordination: Coordination is intact.      Deep Tendon Reflexes: Reflexes are normal and symmetric.   Psychiatric:         Speech: Speech normal.         Procedures    Assessment/Plan:  We are going to start her on Mg and B2 daily.  Cont diamox as  is for now.  RTC July.  A total of 30 minutes was spent face-to-face with the patient today.  Of that greater than 50% of this time was spent discussing signs and symptoms of BIH, patient education, plan of care and prognosis.         Diagnoses and all orders for this visit:    1. Benign intracranial hypertension (Primary)  -     Magnesium Oxide -Mg Supplement 500 MG capsule; Take 1 capsule by mouth Daily for 30 days.  Dispense: 30 capsule; Refill: 4  -     Riboflavin 400 MG capsule; Take 1 capsule by mouth Daily for 30 days.  Dispense: 30 capsule; Refill: 4           Franklin Alvares II, MD

## 2025-03-25 ENCOUNTER — TELEPHONE (OUTPATIENT)
Dept: NEUROLOGY | Facility: CLINIC | Age: 30
End: 2025-03-25

## 2025-03-25 NOTE — TELEPHONE ENCOUNTER
The [facility] received a fax that requires your attention. The document has been indexed to the patient’s chart for your review.      Reason for sending: RECEIVED OV NOTES FOR MD RECORDS    Documents Description:   OV NOTE_[facility]_03/04/25  OV NOTE_[facility]_12/10/24  OV NOTE_[facility]_07/03/24    Name of Sender: [facility]    Date Indexed: 03/25/25    Notes (if needed):

## 2025-03-31 DIAGNOSIS — G93.2 BENIGN INTRACRANIAL HYPERTENSION: ICD-10-CM

## 2025-03-31 RX ORDER — ACETAZOLAMIDE 250 MG/1
250 TABLET ORAL 3 TIMES DAILY
Qty: 90 TABLET | Refills: 3 | Status: SHIPPED | OUTPATIENT
Start: 2025-03-31

## 2025-04-02 RX ORDER — ACETAZOLAMIDE 250 MG/1
250 TABLET ORAL 3 TIMES DAILY
Qty: 270 TABLET | Refills: 1 | OUTPATIENT
Start: 2025-04-02

## 2025-06-22 DIAGNOSIS — G93.2 BENIGN INTRACRANIAL HYPERTENSION: ICD-10-CM

## 2025-06-23 RX ORDER — VITAMIN E (DL,TOCOPHERYL ACET) 180 MG
1 CAPSULE ORAL DAILY
Qty: 90 CAPSULE | Refills: 1 | Status: SHIPPED | OUTPATIENT
Start: 2025-06-23

## 2025-07-30 ENCOUNTER — OFFICE VISIT (OUTPATIENT)
Dept: NEUROLOGY | Facility: CLINIC | Age: 30
End: 2025-07-30
Payer: COMMERCIAL

## 2025-07-30 VITALS
WEIGHT: 146 LBS | SYSTOLIC BLOOD PRESSURE: 114 MMHG | OXYGEN SATURATION: 99 % | HEIGHT: 65 IN | BODY MASS INDEX: 24.32 KG/M2 | HEART RATE: 62 BPM | DIASTOLIC BLOOD PRESSURE: 76 MMHG

## 2025-07-30 DIAGNOSIS — G93.2 BENIGN INTRACRANIAL HYPERTENSION: Primary | ICD-10-CM

## 2025-07-30 PROCEDURE — 99214 OFFICE O/P EST MOD 30 MIN: CPT | Performed by: PSYCHIATRY & NEUROLOGY

## 2025-07-30 RX ORDER — RIBOFLAVIN (VITAMIN B2) 400 MG
1 TABLET ORAL DAILY
COMMUNITY
Start: 2025-07-15

## 2025-07-30 NOTE — PROGRESS NOTES
Chief Complaint   Patient presents with    Benign intracranial hypertension       Patient ID: Neha Singer is a 30 y.o. female.    HPI: I have had the pleasure of seeing your patient today.  As you may know she is a 30-year-old female here for the management of pseudotumor cerebri and migraine headache.  She seems to be doing well.  Her last ophthalmologic evaluation showed no significant papilledema.  She is still taking Diamox 250 mg 3 times daily.  She has been doing well headache wise.  She  notes that she has had significant decrease in the frequency of headaches.  She typically will have 1 or 2 headaches per month.  No new symptoms in general.  She was unable to tolerate magnesium oxide 500 mg daily.  She now takes magnesium 75 mg    The following portions of the patient's history were reviewed and updated as appropriate: allergies, current medications, past family history, past medical history, past social history, past surgical history and problem list.    Review of Systems   Neurological:  Positive for numbness (occasionally toes) and headaches. Negative for dizziness, tremors, seizures, syncope, facial asymmetry, speech difficulty, weakness and light-headedness.   Psychiatric/Behavioral:  Negative for agitation, behavioral problems, confusion, decreased concentration, dysphoric mood, hallucinations, self-injury, sleep disturbance and suicidal ideas. The patient is not nervous/anxious and is not hyperactive.       I have reviewed the review of systems above performed by my medical assistant.      Vitals:    07/30/25 1600   BP: 114/76   Pulse: 62   SpO2: 99%       Neurological Exam  Mental Status  Awake, alert and oriented to person, place and time. Recent and remote memory are intact. Speech is normal. Language is fluent with no aphasia. Attention and concentration are normal. Fund of knowledge is appropriate for level of education.    Cranial Nerves  CN I: Sense of smell is normal.  CN II: Visual acuity  is normal.  CN III, IV, VI: Extraocular movements intact bilaterally. Pupils equal round and reactive to light bilaterally.  CN V: Facial sensation is normal.  CN VII: Full and symmetric facial movement.  CN XI: Shoulder shrug strength is normal.  CN XII: Tongue midline without atrophy or fasciculations.    Motor  Normal muscle bulk throughout. No fasciculations present. Normal muscle tone. No abnormal involuntary movements. No pronator drift.                                             Right                     Left  Rhomboids                            5                          5  Infraspinatus                          5                          5  Supraspinatus                       5                          5  Deltoid                                   5                          5   Biceps                                   5                          5  Brachioradialis                      5                          5   Triceps                                  5                          5   Pronator                                5                          5   Supinator                              5                           5   Wrist flexor                            5                          5   Wrist extensor                       5                          5   Finger flexor                          5                          5   Finger extensor                     5                          5   Interossei                              5                          5   Abductor pollicis brevis         5                          5   Flexor pollicis brevis             5                          5   Opponens pollicis                 5                          5  Extensor digitorum               5                          5  Abductor digiti minimi           5                          5   Abdominal                            5                          5  Glutei                                    5                           5  Hip abductor                         5                          5  Hip adductor                         5                          5   Iliopsoas                               5                          5   Quadriceps                           5                          5   Hamstring                             5                          5   Gastrocnemius                     5                           5   Anterior tibialis                      5                          5   Posterior tibialis                    5                          5   Peroneal                               5                          5  Ankle dorsiflexor                   5                          5  Ankle plantar flexor              5                           5  Extensor hallucis longus      5                           5    Sensory  Sensation is intact to light touch, pinprick, vibration and proprioception in all four extremities.    Reflexes  Deep tendon reflexes are 2+ and symmetric in all four extremities.    Right pathological reflexes: Tai's absent.  Left pathological reflexes: Tai's absent.    Coordination    Finger-to-nose, rapid alternating movements and heel-to-shin normal bilaterally without dysmetria.    Gait  Normal casual, toe, heel and tandem gait.       Physical Exam  Vitals reviewed.   Constitutional:       Appearance: She is well-developed.   HENT:      Head: Normocephalic and atraumatic.   Eyes:      Extraocular Movements: Extraocular movements intact.      Pupils: Pupils are equal, round, and reactive to light.   Cardiovascular:      Rate and Rhythm: Normal rate and regular rhythm.   Pulmonary:      Breath sounds: Normal breath sounds.   Musculoskeletal:         General: Normal range of motion.   Skin:     General: Skin is warm.   Neurological:      Coordination: Coordination is intact.      Deep Tendon Reflexes: Reflexes are normal and symmetric.   Psychiatric:         Speech: Speech normal.          Procedures    Assessment/Plan: I have given her weaning instructions for the Diamox.  We will see her back in 4 months or sooner if needed.  A total of 30 minutes was spent face-to-face with the patient today.  Of that greater than 50% of this time was spent discussing signs and symptoms of pseudotumor cerebri, patient education, plan of care and prognosis.         Diagnoses and all orders for this visit:    1. Benign intracranial hypertension (Primary)           Franklin Alvares II, MD

## 2025-08-27 DIAGNOSIS — G93.2 BENIGN INTRACRANIAL HYPERTENSION: ICD-10-CM

## 2025-08-28 RX ORDER — ACETAZOLAMIDE 250 MG/1
250 TABLET ORAL 3 TIMES DAILY
Qty: 90 TABLET | Refills: 1 | Status: SHIPPED | OUTPATIENT
Start: 2025-08-28